# Patient Record
Sex: MALE | Race: WHITE | NOT HISPANIC OR LATINO | URBAN - METROPOLITAN AREA
[De-identification: names, ages, dates, MRNs, and addresses within clinical notes are randomized per-mention and may not be internally consistent; named-entity substitution may affect disease eponyms.]

---

## 2024-08-28 ENCOUNTER — OFFICE VISIT (OUTPATIENT)
Dept: URBAN - METROPOLITAN AREA CLINIC 48 | Facility: CLINIC | Age: 24
End: 2024-08-28
Payer: COMMERCIAL

## 2024-08-28 ENCOUNTER — DASHBOARD ENCOUNTERS (OUTPATIENT)
Age: 24
End: 2024-08-28

## 2024-08-28 VITALS
HEIGHT: 70 IN | WEIGHT: 184.2 LBS | DIASTOLIC BLOOD PRESSURE: 75 MMHG | SYSTOLIC BLOOD PRESSURE: 132 MMHG | HEART RATE: 77 BPM | TEMPERATURE: 97.7 F | BODY MASS INDEX: 26.37 KG/M2

## 2024-08-28 DIAGNOSIS — L29.0 RECTAL ITCHING: ICD-10-CM

## 2024-08-28 DIAGNOSIS — R19.4 CHANGE IN BOWEL HABITS: ICD-10-CM

## 2024-08-28 PROBLEM — 88111009: Status: ACTIVE | Noted: 2024-08-28

## 2024-08-28 PROBLEM — 90446007: Status: ACTIVE | Noted: 2024-08-28

## 2024-08-28 PROCEDURE — 99204 OFFICE O/P NEW MOD 45 MIN: CPT | Performed by: INTERNAL MEDICINE

## 2024-08-28 RX ORDER — HYDROCORTISONE 25 MG/G
1 APPLICATION CREAM TOPICAL ONCE A DAY
OUTPATIENT
Start: 2024-08-28

## 2024-08-28 RX ORDER — DIPHENHYDRAMINE HYDROCHLORIDE AND ZINC ACETATE .01; .001 MG/G; U/G
1 CAPSULE WITH FOOD OR MILK AS NEEDED CREAM TOPICAL THREE TIMES A DAY
Status: ACTIVE | COMMUNITY

## 2024-08-28 NOTE — HPI-TODAY'S VISIT:
24 year old male presents with evalution of diarrhea. Last year he began having post prandial loose stools. At that time, he did not have new medication or antibiotics. Stomach cramping occurs with bm but resolved after. He complains of rectal itching and hemorrhoidal flare. Hemorrhoid cream and witch hazel helps. His weight has been steady. Probiotics have helped some. Additonally he has had increased life events for he is seeing a counselor. Denies GI diseae in the family.

## 2024-09-05 ENCOUNTER — TELEPHONE ENCOUNTER (OUTPATIENT)
Dept: URBAN - METROPOLITAN AREA CLINIC 46 | Facility: CLINIC | Age: 24
End: 2024-09-05

## 2024-09-05 RX ORDER — RIFAXIMIN 550 MG/1
1 TABLET TABLET ORAL THREE TIMES A DAY
Qty: 42 TABLET | Refills: 0 | OUTPATIENT
Start: 2024-09-05 | End: 2024-09-19

## 2024-09-06 ENCOUNTER — TELEPHONE ENCOUNTER (OUTPATIENT)
Dept: URBAN - METROPOLITAN AREA CLINIC 46 | Facility: CLINIC | Age: 24
End: 2024-09-06

## 2024-09-09 ENCOUNTER — TELEPHONE ENCOUNTER (OUTPATIENT)
Dept: URBAN - METROPOLITAN AREA CLINIC 46 | Facility: CLINIC | Age: 24
End: 2024-09-09

## 2024-09-10 ENCOUNTER — P2P PATIENT RECORD (OUTPATIENT)
Age: 24
End: 2024-09-10

## 2024-09-11 ENCOUNTER — TELEPHONE ENCOUNTER (OUTPATIENT)
Dept: URBAN - METROPOLITAN AREA CLINIC 46 | Facility: CLINIC | Age: 24
End: 2024-09-11

## 2024-09-24 ENCOUNTER — LAB OUTSIDE AN ENCOUNTER (OUTPATIENT)
Dept: URBAN - METROPOLITAN AREA CLINIC 44 | Facility: CLINIC | Age: 24
End: 2024-09-24

## 2024-09-24 ENCOUNTER — TELEPHONE ENCOUNTER (OUTPATIENT)
Dept: URBAN - METROPOLITAN AREA CLINIC 44 | Facility: CLINIC | Age: 24
End: 2024-09-24

## 2024-09-26 ENCOUNTER — LAB OUTSIDE AN ENCOUNTER (OUTPATIENT)
Dept: URBAN - METROPOLITAN AREA CLINIC 46 | Facility: CLINIC | Age: 24
End: 2024-09-26

## 2024-10-03 ENCOUNTER — OFFICE VISIT (OUTPATIENT)
Dept: URBAN - METROPOLITAN AREA SURGERY CENTER 27 | Facility: SURGERY CENTER | Age: 24
End: 2024-10-03

## 2024-11-19 ENCOUNTER — OFFICE VISIT (OUTPATIENT)
Dept: URBAN - METROPOLITAN AREA TELEHEALTH 2 | Facility: TELEHEALTH | Age: 24
End: 2024-11-19